# Patient Record
Sex: MALE | URBAN - METROPOLITAN AREA
[De-identification: names, ages, dates, MRNs, and addresses within clinical notes are randomized per-mention and may not be internally consistent; named-entity substitution may affect disease eponyms.]

---

## 2018-01-08 ENCOUNTER — HOSPITAL ENCOUNTER (OUTPATIENT)
Age: 58
Discharge: HOME | End: 2018-01-08
Attending: FAMILY MEDICINE
Payer: COMMERCIAL

## 2018-01-08 ENCOUNTER — NURSE ONLY (OUTPATIENT)
Dept: FAMILY MEDICINE CLINIC | Facility: CLINIC | Age: 58
End: 2018-01-08

## 2018-01-08 VITALS
SYSTOLIC BLOOD PRESSURE: 117 MMHG | WEIGHT: 180 LBS | TEMPERATURE: 99 F | OXYGEN SATURATION: 99 % | HEART RATE: 117 BPM | DIASTOLIC BLOOD PRESSURE: 106 MMHG | RESPIRATION RATE: 20 BRPM

## 2018-01-08 VITALS
OXYGEN SATURATION: 98 % | RESPIRATION RATE: 16 BRPM | SYSTOLIC BLOOD PRESSURE: 156 MMHG | HEART RATE: 104 BPM | WEIGHT: 182 LBS | DIASTOLIC BLOOD PRESSURE: 90 MMHG

## 2018-01-08 DIAGNOSIS — M10.9 ACUTE GOUT OF LEFT ANKLE, UNSPECIFIED CAUSE: Primary | ICD-10-CM

## 2018-01-08 DIAGNOSIS — R03.0 ELEVATED BLOOD PRESSURE READING: ICD-10-CM

## 2018-01-08 DIAGNOSIS — M79.672 LEFT FOOT PAIN: ICD-10-CM

## 2018-01-08 DIAGNOSIS — M25.475 SWELLING OF FOOT JOINT, LEFT: Primary | ICD-10-CM

## 2018-01-08 PROCEDURE — 99203 OFFICE O/P NEW LOW 30 MIN: CPT

## 2018-01-08 PROCEDURE — 99202 OFFICE O/P NEW SF 15 MIN: CPT | Performed by: PHYSICIAN ASSISTANT

## 2018-01-08 RX ORDER — INDOMETHACIN 75 MG/1
75 CAPSULE, EXTENDED RELEASE ORAL 2 TIMES DAILY WITH MEALS
Qty: 10 CAPSULE | Refills: 0 | Status: SHIPPED | OUTPATIENT
Start: 2018-01-08 | End: 2018-01-13

## 2018-01-08 NOTE — ED NOTES
Advised pt to take BP med daily around the same time.  Stressed importance of taking BP meds regularly and f/u with PMD

## 2018-01-08 NOTE — PROGRESS NOTES
CHIEF COMPLAINT:     Patient presents with: Foot Pain: started 2 days ago on left, right foot pain a week ago, given injections on right last week      HPI:   Aristeo Gilbert is a 65year old male who is here for complaints of left pain for 2 days.   Pain is inspection, no pain on palpation of the spinal and paraspinal muscles.    FEET: left foot with mild swelling, no erythema, moderate tenderness to medial and lateral malleolus, negative homans sign, +2 DP and PT  NEURO: Sensation intact in feet bilaterally

## 2018-01-08 NOTE — ED PROVIDER NOTES
Patient Seen [address]: [address]    History   Patient presents with:  Swelling Edema (cardiovascular, metabolic): left foot     Stated Complaint: Gilma Bailey     HPI    Pt is a 66 yo who presents with a 2 day h/o of lef not tasking his meds regularly. Stressed the importance of doing so. And a gouty flare up of his left ankle. (no trauma). Pt was prescribed Indocin and was advised to go to f/u with his PCP in 1-2 days.         Disposition and Plan     Clinical Impression:

## 2018-01-08 NOTE — ED INITIAL ASSESSMENT (HPI)
Redness, edematous laterally and medially to left food. Symptoms since Saturday. Increased pain when walking.  Hx of gout